# Patient Record
Sex: FEMALE | Race: WHITE | Employment: OTHER | URBAN - METROPOLITAN AREA
[De-identification: names, ages, dates, MRNs, and addresses within clinical notes are randomized per-mention and may not be internally consistent; named-entity substitution may affect disease eponyms.]

---

## 2017-02-15 ENCOUNTER — HOSPITAL ENCOUNTER (OUTPATIENT)
Dept: MAMMOGRAPHY | Age: 77
Discharge: HOME | End: 2017-02-15
Attending: FAMILY MEDICINE
Payer: MEDICARE

## 2017-02-15 DIAGNOSIS — Z13.820 SCREENING FOR OSTEOPOROSIS: ICD-10-CM

## 2017-02-15 PROCEDURE — 77080 DXA BONE DENSITY AXIAL: CPT

## 2017-03-12 PROBLEM — M54.42 CHRONIC BILATERAL LOW BACK PAIN WITH BILATERAL SCIATICA: Status: ACTIVE | Noted: 2017-03-12

## 2017-03-12 PROBLEM — M54.42 CHRONIC BILATERAL LOW BACK PAIN WITH BILATERAL SCIATICA: Chronic | Status: ACTIVE | Noted: 2017-03-12

## 2017-03-12 PROBLEM — M54.41 CHRONIC BILATERAL LOW BACK PAIN WITH BILATERAL SCIATICA: Status: ACTIVE | Noted: 2017-03-12

## 2017-03-12 PROBLEM — M54.41 CHRONIC BILATERAL LOW BACK PAIN WITH BILATERAL SCIATICA: Chronic | Status: ACTIVE | Noted: 2017-03-12

## 2017-03-12 PROBLEM — G89.29 CHRONIC BILATERAL LOW BACK PAIN WITH BILATERAL SCIATICA: Chronic | Status: ACTIVE | Noted: 2017-03-12

## 2017-03-12 PROBLEM — G89.29 CHRONIC BILATERAL LOW BACK PAIN WITH BILATERAL SCIATICA: Status: ACTIVE | Noted: 2017-03-12

## 2017-04-11 ENCOUNTER — HOSPITAL ENCOUNTER (OUTPATIENT)
Dept: GENERAL RADIOLOGY | Age: 77
Discharge: HOME | End: 2017-04-11
Payer: MEDICARE

## 2017-04-11 PROCEDURE — 71100 X-RAY EXAM RIBS UNI 2 VIEWS: CPT

## 2017-04-11 PROCEDURE — 72072 X-RAY EXAM THORAC SPINE 3VWS: CPT

## 2017-04-11 PROCEDURE — 71020 XR CHEST PA LAT: CPT

## 2017-10-30 PROBLEM — R73.02 IMPAIRED GLUCOSE TOLERANCE: Chronic | Status: ACTIVE | Noted: 2017-10-30

## 2018-04-03 PROBLEM — E16.1 NOCTURNAL HYPOGLYCEMIA: Status: ACTIVE | Noted: 2018-04-03

## 2018-04-03 PROBLEM — E16.1 NOCTURNAL HYPOGLYCEMIA: Status: RESOLVED | Noted: 2018-04-03 | Resolved: 2018-04-03

## 2018-04-03 PROBLEM — G47.34 NOCTURNAL HYPOXEMIA: Status: ACTIVE | Noted: 2018-04-03

## 2018-06-04 PROBLEM — G47.00 PERSISTENT DISORDER OF INITIATING OR MAINTAINING SLEEP: Status: ACTIVE | Noted: 2018-06-04

## 2022-03-18 PROBLEM — G47.34 NOCTURNAL HYPOXEMIA: Status: ACTIVE | Noted: 2018-04-03

## 2022-03-18 PROBLEM — R73.02 IMPAIRED GLUCOSE TOLERANCE: Status: ACTIVE | Noted: 2017-10-30

## 2022-03-19 PROBLEM — M54.41 CHRONIC BILATERAL LOW BACK PAIN WITH BILATERAL SCIATICA: Status: ACTIVE | Noted: 2017-03-12

## 2022-03-19 PROBLEM — M54.42 CHRONIC BILATERAL LOW BACK PAIN WITH BILATERAL SCIATICA: Status: ACTIVE | Noted: 2017-03-12

## 2022-03-19 PROBLEM — G89.29 CHRONIC BILATERAL LOW BACK PAIN WITH BILATERAL SCIATICA: Status: ACTIVE | Noted: 2017-03-12

## 2022-03-19 PROBLEM — G47.00 PERSISTENT DISORDER OF INITIATING OR MAINTAINING SLEEP: Status: ACTIVE | Noted: 2018-06-04

## 2022-08-09 ENCOUNTER — TELEPHONE (OUTPATIENT)
Dept: PULMONOLOGY | Age: 82
End: 2022-08-09

## 2022-08-09 DIAGNOSIS — G47.00 PERSISTENT DISORDER OF INITIATING OR MAINTAINING SLEEP: Primary | ICD-10-CM

## 2022-08-09 RX ORDER — TEMAZEPAM 15 MG/1
15-30 CAPSULE ORAL NIGHTLY PRN
Qty: 60 CAPSULE | Refills: 1 | Status: SHIPPED | OUTPATIENT
Start: 2022-08-09 | End: 2022-09-02 | Stop reason: SDUPTHER

## 2022-08-09 NOTE — TELEPHONE ENCOUNTER
Pt says that she needs to get Temazepam refilled. She was in the hospital in July and missed her appointment. Her next appointment had to be rescheduled due to provider pulled to the hospital. Pharmacy is [facility] [address] in [place]. [phone]    Please follow up. Call both numbers if necessary.

## 2022-08-31 NOTE — PROGRESS NOTES
[address] [place] [address] Court, [address] [facility]  [phone]    Patient Name:  Reta Cordova  YOB: 1940    Pursuant to the emergency declaration under the [address] [place] [address], [facility] waiver authority and the [employer] Act, this Audio  Visit was conducted, with patient's consent, to reduce the patient's risk of exposure to COVID-19 and provide continuity of care for an established patient. Telehealth encounter is a billable service, with coverage as determined by the insurance carrier. Services were provided through a synchronous (real-time) audio only encounter to substitute for in-person clinic visit. Reta Cordova is a 80 y.o. female who was seen by synchronous (real-time) audio technology on 9/2/2022. Consent:  She and/or her healthcare decision maker is aware that this patient-initiated Telehealth encounter is a billable service, with coverage as determined by her insurance carrier. She is aware that she may receive a bill and has provided verbal consent to proceed: Yes        Office Visit 9/2/2022    CHIEF COMPLAINT:    Chief Complaint   Patient presents with    Sleep Apnea    Follow-up       HISTORY OF PRESENT ILLNESS:  Patient is being seen today via audio visit for obstructive sleep apnea. Pt is a 80 y.o. female  with a history of PAM, insomnia, and nocturnal hypoxemia. Pt had a PSG/HST on 11/16/15 with an AHI of 33.2/hr with desaturations  to 75%. Her last appointment was virtual on 02/07/2022 and her AHI was 6.2. She is prescribed cpap therapy with a humidifier set at 12cm with a nasal mask and 2 L oxygen bleed in. Most recent download reveals AHI on PAP therapy is 4.1, leak is 26.9 and the hourly usage is 3 hours 6 minutes nightly. The overall use is 295 hours with days greater than four hours at 20/180.   She reports that she had a fall in April of this year and hurt her knee and then she had pneumonia and required hospitalization in June. States she did not use her CPAP but sporadically during those time periods. She reports that she will wear the CPAP when she goes to sleep but then 2 or 3 hours into sleep she awakens to pain from chronic back pain from a previous back surgery and takes the mask off. She admits that when she does use her CPAP she feels better during the day and rejuvenated. Encouraged her to use the CPAP throughout the whole night sleep due to the benefits it has in correcting her sleep apnea. She reports feeling well in the mornings and states that she does not have any daytime fatigue. She denies taking any naps during the day. [place] score 0/24. She reports history of having difficulty going to sleep. She is taking temazepam at night to help her sleep. It  works well and she is able to sleep well. She has been prescribed this by sleep medicine and denies any side effects from the medication. She reports history of being on hydrocodone for back pain but no longer takes hydrocodone and is prescribed tramadol by pain management. She states her current weight is about 175 pounds denies any weight loss or weight gain over the last year. Denies any problems of her blood pressure being high.         Sleep Medicine 9/2/2022 6/15/2021   Sitting and reading 0 0   Watching TV 0 0   Sitting, inactive in a public place (e.g. a theatre or a meeting) 0 0   As a passenger in a car for an hour without a break 0 0   Lying down to rest in the afternoon when circumstances permit 0 1   Sitting and talking to someone 0 0   Sitting quietly after a lunch without alcohol 0 0   In a car, while stopped for a few minutes in traffic 0 0   [place] Sleepiness Score 0 1      Past Medical History:   Diagnosis Date    Anxiety     Arthritis     Chronic cough since August 2015    Chronic pain     lower back, both legs, left shoulder    Depression     depression & anxiety Fatty liver 10/11/2016    History of anemia     Hypertension     controlled with medication    Mechanical complication of dorsal column stimulator ([address] 2015    Obesity (BMI 30-39.9)     35.3    PAM (obstructive sleep apnea) 2015    RLS (restless legs syndrome)     Snoring     recent sleep study - no results yet    Thyroid disease     hypothyroidism - controlled with medication       Patient Active Problem List   Diagnosis    Nocturnal hypoxemia    Fatty liver    Mechanical complication of dorsal column stimulator (HCC)    Morbid obesity (HCC)    Impaired glucose tolerance    Chronic bilateral low back pain with bilateral sciatica    Arthritis    Persistent disorder of initiating or maintaining sleep    HTN (hypertension)    Dyslipidemia    Anxiety    Obesity (BMI 30.0-34.9)    PAM (obstructive sleep apnea)    Hypothyroidism    Chronic pain           Past Surgical History:   Procedure Laterality Date    APPENDECTOMY      BACK SURGERY      trial SCS    BLADDER SUSPENSION      CARPAL TUNNEL RELEASE Bilateral     bilateral    HYSTERECTOMY (CERVIX STATUS UNKNOWN)      KNEE ARTHROSCOPY  ?2012    right    LUMBAR FUSION  2007    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    ORTHOPEDIC SURGERY Bilateral     bilat thumb surgery    OVARY REMOVAL Bilateral     both ovaries removed    TONSILLECTOMY AND ADENOIDECTOMY      as  child    TOTAL HIP ARTHROPLASTY  2014    left           Social History     Socioeconomic History    Marital status:      Spouse name: Not on file    Number of children: Not on file    Years of education: Not on file    Highest education level: Not on file   Occupational History    Not on file   Tobacco Use    Smoking status: Former     Packs/day: 1.00     Types: Cigarettes     Quit date: 1995     Years since quittin.6    Smokeless tobacco: Never    Tobacco comments:     Quit smoking: quit smoking    Substance and Sexual Activity    Alcohol use:  Yes Alcohol/week: 7.0 standard drinks    Drug use: No    Sexual activity: Not on file   Other Topics Concern    Not on file   Social History Narrative    Not on file     Social Determinants of Health     Financial Resource Strain: Not on file   Food Insecurity: Not on file   Transportation Needs: Not on file   Physical Activity: Not on file   Stress: Not on file   Social Connections: Not on file   Intimate Partner Violence: Not on file   Housing Stability: Not on file         Family History   Problem Relation Age of Onset    Post-op Cognitive Dysfunction Neg Hx     Cancer Mother         breast    Post-op Nausea/Vomiting Neg Hx     Delayed Awakening Neg Hx     Pseudochol. Deficiency Neg Hx     Malig Hypertherm Neg Hx     Elevated Lipids Sister     Diabetes Sister     Emergence Delirium Neg Hx     Breast Cancer Mother     Other Neg Hx     Hypertension Sister          Allergies   Allergen Reactions    Amoxicillin-Pot Clavulanate Anaphylaxis    Penicillins Anaphylaxis    Sulfa Antibiotics Other (See Comments)     \"upset stomach\" pt does not remember this allergy    Doxycycline Palpitations     \"gives me chest pain. \"         Current Outpatient Medications   Medication Sig    [START ON 10/10/2022] temazepam (RESTORIL) 15 MG capsule Take 1-2 capsules by mouth nightly as needed for Sleep for up to 150 days.  TAKE 1 TO 2 CAPSULES BY MOUTH NIGHTLY AS NEEDED FOR SLEEP    BABY ASPIRIN PO Take 81 mg by mouth every evening    BIOTIN PO Take 1,000 mcg by mouth    amLODIPine (NORVASC) 10 MG tablet TAKE ONE TABLET BY MOUTH EVERY MORNING    atorvastatin (LIPITOR) 40 MG tablet TAKE 1 TABLET BY MOUTH ONCE DAILY FOR 90 DAYS    cetirizine (ZYRTEC) 10 MG tablet Take 1 tablet by mouth once daily    vitamin D 25 MCG (1000 UT) CAPS Take 1,000 Units by mouth daily    dicyclomine (BENTYL) 20 MG tablet TAKE ONE TABLET BY MOUTH TWICE DAILY    fluticasone (FLONASE) 50 MCG/ACT nasal spray USE TWO SPRAY(S) IN EACH NOSTRIL ONCE DAILY    gabapentin (NEURONTIN) 600 MG tablet Take 600 mg by mouth 3 times daily. levothyroxine (SYNTHROID) 100 MCG tablet TAKE ONE TABLET BY MOUTH ONCE DAILY BEFORE BREAKFAST    losartan-hydroCHLOROthiazide (HYZAAR) 50-12.5 MG per tablet TAKE ONE TABLET BY MOUTH ONCE DAILY    montelukast (SINGULAIR) 10 MG tablet Take 1 tablet by mouth once daily    nabumetone (RELAFEN) 500 MG tablet Take 500 mg by mouth 2 times daily    tiZANidine (ZANAFLEX) 4 MG tablet Take 4 mg by mouth every 6 hours as needed    metFORMIN (GLUCOPHAGE) 500 MG tablet Take 500 mg by mouth 2 times daily (with meals) (Patient not taking: Reported on 9/2/2022)    metoprolol succinate (TOPROL XL) 50 MG extended release tablet Take 50 mg by mouth daily (Patient not taking: Reported on 9/2/2022)     No current facility-administered medications for this visit. REVIEW OF SYSTEMS:   CONSTITUTIONAL:   There is no history of fever, chills, night sweats. Patient has denies weight loss or weight gain. Negative for fatigue and hypersomnia and is benefiting from CPAP. CARDIAC:   No chest pain, pressure, discomfort, palpitations, orthopnea, murmurs, or edema. GI:   No dysphagia, heartburn reflux, nausea/vomiting, diarrhea, abdominal pain, or bleeding. NEURO:   There is no history of AMS, persistent headache, decreased level of consciousness, seizures, or motor or sensory deficits. VIRTUAL EXAM  PHYSICAL EXAMINATION:  [ INSTRUCTIONS:  \"[x]\" Indicates a positive item  \"[]\" Indicates a negative item   Vital Signs: (As obtained by patient/caregiver at home)  There were no vitals taken for this visit.      Constitutional: [x] Appears well-developed and well-nourished [x] No apparent distress      [] Abnormal     Mental status: [x] Alert and awake  [x] Oriented to person/place/time [x] Able to follow commands    [] Abnormal -     Eyes:   EOM    [x]  Normal    [] Abnormal -   Sclera  [x]  Normal    [] Abnormal -          Discharge [x]  None visible   [] Abnormal -    HENT: [x] Normocephalic, atraumatic  [] Abnormal -  [x] Mouth/Throat: Mucous membranes are moist    External Ears [x] Normal  [] Abnormal -    Neck: [x] No visualized mass [] Abnormal -     Pulmonary/Chest: [x] Respiratory effort normal   [x] No visualized signs of difficulty breathing or respiratory distress        [] Abnormal -      Musculoskeletal:   [x] Normal gait with no signs of ataxia         [x] Normal range of motion of neck        [] Abnormal -     Neurological:        [x] No Facial Asymmetry (Cranial nerve 7 motor function) (limited exam due to video visit)          [x] No gaze palsy        [] Abnormal -         Skin:        [x] No significant exanthematous lesions or discoloration noted on facial skin         [] Abnormal -            Psychiatric:       [x] Normal Affect [] Abnormal -       [x] No Hallucinations    Other pertinent observable physical exam findings:-      ASSESSMENT:  (Medical Decision Making)      Diagnosis Orders   1. PAM (obstructive sleep apnea)  Patient is benefiting from CPAP therapy when used. Due to illness and some hospital stays this year she has not been able to get into consistent rhythm with using her CPAP. She also reports wearing it for the first 3 to 4 hours when she goes to bed but then she has to get up due to back pain and does not put the CPAP back on. Encouraged patient to use CPAP nightly and explained the benefits of CPAP for sleep apnea. 2. Primary insomnia  Continue temazepam nightly      3. Nocturnal hypoxemia  Continue 2L oxygen bleed in with CPAP therapy      4.  Non-restorative sleep  Improved with temazepam.      5. Persistent disorder of initiating or maintaining sleep  temazepam (RESTORIL) 15 MG capsule           PLAN:    Continue CPAP 12 cm H2O with improved nightly compliance with 2 L oxygen bleed in  Continue temazepam for insomnia  Recommendations as above  Follow-up in 6 months or sooner if needed    I have reviewed the patients controlled substance prescription history, as maintained in the [place] prescription monitoring program and found no evidence for abuse of Temazepam.      Refilled prescription for Temazepam to start on 10/10/2022 with 4 refills to go through 03/09/2023. Patient had received prescription by telephone that is good through 09/09/2022. Collaborating Physician: Dr. Shagufta Meza      I spent at least 15 minutes with this established patient, and >50% of the time was spent counseling and/or coordinating care regarding obstructive sleep apnea and CPAP therapy.     [address], APRN - CNP  Electronically signed

## 2022-09-02 ENCOUNTER — TELEMEDICINE (OUTPATIENT)
Dept: SLEEP MEDICINE | Age: 82
End: 2022-09-02
Payer: MEDICARE

## 2022-09-02 DIAGNOSIS — G47.34 NOCTURNAL HYPOXEMIA: ICD-10-CM

## 2022-09-02 DIAGNOSIS — F51.01 PRIMARY INSOMNIA: ICD-10-CM

## 2022-09-02 DIAGNOSIS — G47.33 OSA (OBSTRUCTIVE SLEEP APNEA): Primary | ICD-10-CM

## 2022-09-02 DIAGNOSIS — G47.00 PERSISTENT DISORDER OF INITIATING OR MAINTAINING SLEEP: ICD-10-CM

## 2022-09-02 DIAGNOSIS — G47.8 NON-RESTORATIVE SLEEP: ICD-10-CM

## 2022-09-02 PROCEDURE — 99442 PR PHYS/QHP TELEPHONE EVALUATION 11-20 MIN: CPT | Performed by: NURSE PRACTITIONER

## 2022-09-02 RX ORDER — TEMAZEPAM 15 MG/1
15-30 CAPSULE ORAL NIGHTLY PRN
Qty: 60 CAPSULE | Refills: 4 | Status: SHIPPED | OUTPATIENT
Start: 2022-10-10 | End: 2023-03-09

## 2022-09-02 ASSESSMENT — SLEEP AND FATIGUE QUESTIONNAIRES
ESS TOTAL SCORE: 0
HOW LIKELY ARE YOU TO NOD OFF OR FALL ASLEEP WHILE SITTING INACTIVE IN A PUBLIC PLACE: 0
HOW LIKELY ARE YOU TO NOD OFF OR FALL ASLEEP WHILE SITTING AND READING: 0
HOW LIKELY ARE YOU TO NOD OFF OR FALL ASLEEP WHILE LYING DOWN TO REST IN THE AFTERNOON WHEN CIRCUMSTANCES PERMIT: 0
HOW LIKELY ARE YOU TO NOD OFF OR FALL ASLEEP WHILE SITTING QUIETLY AFTER LUNCH WITHOUT ALCOHOL: 0
HOW LIKELY ARE YOU TO NOD OFF OR FALL ASLEEP WHEN YOU ARE A PASSENGER IN A CAR FOR AN HOUR WITHOUT A BREAK: 0
HOW LIKELY ARE YOU TO NOD OFF OR FALL ASLEEP WHILE SITTING AND TALKING TO SOMEONE: 0
HOW LIKELY ARE YOU TO NOD OFF OR FALL ASLEEP WHILE WATCHING TV: 0
HOW LIKELY ARE YOU TO NOD OFF OR FALL ASLEEP IN A CAR, WHILE STOPPED FOR A FEW MINUTES IN TRAFFIC: 0

## 2022-09-02 NOTE — PATIENT INSTRUCTIONS
Continue CPAP 12 cm H2O with improved nightly compliance with 2 L oxygen bleed in  Continue temazepam for insomnia  Recommendations as above  Follow-up in 6 months or sooner if needed

## 2023-03-01 NOTE — PROGRESS NOTES
[address] [place] [address] Court, [address] [facility]  [phone]    Patient Name:  Kevin Sanchez  YOB: 1940    Pursuant to the emergency declaration under the [address] [place] [address], [facility] waiver authority and the [employer] Act, this Audio  Visit was conducted, with patient's consent, to reduce the patient's risk of exposure to COVID-19 and provide continuity of care for an established patient. Telehealth encounter is a billable service, with coverage as determined by the insurance carrier. Services were provided through a synchronous (real-time) audio only encounter to substitute for in-person clinic visit. Kevin Sanchez is a [de-identified] y.o. female who was seen by synchronous (real-time) audio technology on 3/2/2023. Consent:  She and/or her healthcare decision maker is aware that this patient-initiated Telehealth encounter is a billable service, with coverage as determined by her insurance carrier. She is aware that she may receive a bill and has provided verbal consent to proceed: Yes        Office Visit 3/2/2023    CHIEF COMPLAINT:    Chief Complaint   Patient presents with    Sleep Apnea         HISTORY OF PRESENT ILLNESS:  Patient is being seen today via audio visit for obstructive sleep apnea. Pt is a [de-identified] y.o. female  with a history of PAM, insomnia, and nocturnal hypoxemia. Pt had a PSG/HST on 11/16/15 with an AHI of 33.2/hr with desaturations  to 75%. Her last appointment was virtual on 09/02/2022 and her AHI was 4.1  . She is prescribed cpap therapy with a humidifier set at 12cm with a nasal mask and 2 L oxygen bleed in. Most recent download reveals AHI on PAP therapy is 4.9, leak is 21.0 and the hourly usage is 3 hours 31 minutes nightly. The overall use is 619 hours with days greater than four hours at 52/180.   Her CPAP use has become more consistent and her hourly use has also increased but still the majority of time she is less than 4 hours. She states that she is been having a lot of problems with her back and right leg pain recently. She has a spinal stimulator that she states she was instructed to return the settings up but she has since found out that the settings were too high and this was causing her problems. Reports that this was often waking her up during the night and she would have to stop using the CPAP at that point. She does report that she feels much better on the nights when she can use the CPAP throughout the night. Her AHI is controlled with CPAP use. She is also on temazepam and has been on this for years to help her with initiating and maintaining sleep. She states that this continues to work well and she is not having any adverse effects from the medication. She will need a refill of this medication today. She denies any other major medical changes. Reports that her weight is currently 175 pounds. States that her last blood pressure check was good.             Sleep Medicine 3/2/2023 9/2/2022 6/15/2021   Sitting and reading 0 0 0   Watching TV 1 0 0   Sitting, inactive in a public place (e.g. a theatre or a meeting) 1 0 0   As a passenger in a car for an hour without a break 0 0 0   Lying down to rest in the afternoon when circumstances permit 1 0 1   Sitting and talking to someone 0 0 0   Sitting quietly after a lunch without alcohol 0 0 0   In a car, while stopped for a few minutes in traffic 0 0 0   [place] Sleepiness Score 3 0 1      Past Medical History:   Diagnosis Date    Anxiety     Arthritis     Chronic cough since August 2015    Chronic pain     lower back, both legs, left shoulder    Depression     depression & anxiety    Fatty liver 10/11/2016    History of anemia     Hypertension     controlled with medication    Mechanical complication of dorsal column stimulator ([address] 12/7/2015    Obesity (BMI 30-39.9)     35.3    PAM (obstructive sleep apnea) 2015    RLS (restless legs syndrome)     Snoring     recent sleep study - no results yet    Thyroid disease     hypothyroidism - controlled with medication       Patient Active Problem List   Diagnosis    Nocturnal hypoxemia    Fatty liver    Mechanical complication of dorsal column stimulator (HCC)    Morbid obesity (HCC)    Impaired glucose tolerance    Chronic bilateral low back pain with bilateral sciatica    Arthritis    Persistent disorder of initiating or maintaining sleep    HTN (hypertension)    Dyslipidemia    Anxiety    Obesity (BMI 30.0-34.9)    PAM (obstructive sleep apnea)    Hypothyroidism    Chronic pain           Past Surgical History:   Procedure Laterality Date    APPENDECTOMY      BACK SURGERY      trial SCS    BLADDER SUSPENSION      CARPAL TUNNEL RELEASE Bilateral     bilateral    HYSTERECTOMY (CERVIX STATUS UNKNOWN)      KNEE ARTHROSCOPY  ?2012    right    LUMBAR FUSION  2007    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    ORTHOPEDIC SURGERY Bilateral     bilat thumb surgery    OVARY REMOVAL Bilateral     both ovaries removed    TONSILLECTOMY AND ADENOIDECTOMY      as  child    TOTAL HIP ARTHROPLASTY  2014    left           Social History     Socioeconomic History    Marital status:      Spouse name: Not on file    Number of children: Not on file    Years of education: Not on file    Highest education level: Not on file   Occupational History    Not on file   Tobacco Use    Smoking status: Former     Packs/day: 1.00     Types: Cigarettes     Quit date: 1995     Years since quittin.1    Smokeless tobacco: Never    Tobacco comments:     Quit smoking: quit smoking    Substance and Sexual Activity    Alcohol use:  Yes     Alcohol/week: 7.0 standard drinks    Drug use: No    Sexual activity: Not on file   Other Topics Concern    Not on file   Social History Narrative    Not on file     Social Determinants of Health     Financial Resource Strain: Not on file   Food Insecurity: Not on file   Transportation Needs: Not on file   Physical Activity: Not on file   Stress: Not on file   Social Connections: Not on file   Intimate Partner Violence: Not on file   Housing Stability: Not on file         Family History   Problem Relation Age of Onset    Post-op Cognitive Dysfunction Neg Hx     Cancer Mother         breast    Post-op Nausea/Vomiting Neg Hx     Delayed Awakening Neg Hx     Pseudochol. Deficiency Neg Hx     Malig Hypertherm Neg Hx     Elevated Lipids Sister     Diabetes Sister     Emergence Delirium Neg Hx     Breast Cancer Mother     Other Neg Hx     Hypertension Sister          Allergies   Allergen Reactions    Amoxicillin-Pot Clavulanate Anaphylaxis    Penicillins Anaphylaxis    Sulfa Antibiotics Other (See Comments)     \"upset stomach\" pt does not remember this allergy    Doxycycline Palpitations     \"gives me chest pain. \"         Current Outpatient Medications   Medication Sig    diclofenac (CATAFLAM) 50 MG tablet TAKE 1 TABLET BY MOUTH TWICE DAILY WITH FOOD OR MILK FOR 30 DAYS    HYDROcodone-acetaminophen (NORCO) 5-325 MG per tablet TAKE 1 TABLET BY MOUTH EVERY 4 HOURS FOR 30 DAYS    temazepam (RESTORIL) 15 MG capsule Take 1-2 capsules by mouth nightly as needed for Sleep for up to 180 days.  TAKE 1 TO 2 CAPSULES BY MOUTH NIGHTLY AS NEEDED FOR SLEEP Max Daily Amount: 30 mg    BABY ASPIRIN PO Take 81 mg by mouth every evening    BIOTIN PO Take 1,000 mcg by mouth    amLODIPine (NORVASC) 10 MG tablet TAKE ONE TABLET BY MOUTH EVERY MORNING    atorvastatin (LIPITOR) 40 MG tablet TAKE 1 TABLET BY MOUTH ONCE DAILY FOR 90 DAYS    cetirizine (ZYRTEC) 10 MG tablet Take 1 tablet by mouth once daily    vitamin D 25 MCG (1000 UT) CAPS Take 1,000 Units by mouth daily    dicyclomine (BENTYL) 20 MG tablet TAKE ONE TABLET BY MOUTH TWICE DAILY    fluticasone (FLONASE) 50 MCG/ACT nasal spray USE TWO SPRAY(S) IN EACH NOSTRIL ONCE DAILY    gabapentin (NEURONTIN) 600 MG tablet Take 600 mg by mouth 3 times daily. levothyroxine (SYNTHROID) 100 MCG tablet TAKE ONE TABLET BY MOUTH ONCE DAILY BEFORE BREAKFAST    losartan-hydroCHLOROthiazide (HYZAAR) 50-12.5 MG per tablet TAKE ONE TABLET BY MOUTH ONCE DAILY    montelukast (SINGULAIR) 10 MG tablet Take 1 tablet by mouth once daily    nabumetone (RELAFEN) 500 MG tablet Take 500 mg by mouth 2 times daily    tiZANidine (ZANAFLEX) 4 MG tablet Take 4 mg by mouth every 6 hours as needed    metFORMIN (GLUCOPHAGE) 500 MG tablet Take 500 mg by mouth 2 times daily (with meals) (Patient not taking: No sig reported)    metoprolol succinate (TOPROL XL) 50 MG extended release tablet Take 50 mg by mouth daily (Patient not taking: No sig reported)     No current facility-administered medications for this visit. REVIEW OF SYSTEMS:   CONSTITUTIONAL:   There is no history of fever, chills, night sweats. Patient has denies weight loss or weight gain. Negative for fatigue and hypersomnia and is benefiting from CPAP. CARDIAC:   No chest pain, pressure, discomfort, palpitations, orthopnea, murmurs, or edema. GI:   No dysphagia, heartburn reflux, nausea/vomiting, diarrhea, abdominal pain, or bleeding. NEURO:   There is no history of AMS, persistent headache, decreased level of consciousness, seizures, or motor or sensory deficits. VIRTUAL EXAM  PHYSICAL EXAMINATION:  [ INSTRUCTIONS:  \"[x]\" Indicates a positive item  \"[]\" Indicates a negative item   Vital Signs: (As obtained by patient/caregiver at home)  There were no vitals taken for this visit.      Constitutional: [x] Appears well-developed and well-nourished [x] No apparent distress      [] Abnormal     Mental status: [x] Alert and awake  [x] Oriented to person/place/time [x] Able to follow commands    [] Abnormal -     Eyes:   EOM    [x]  Normal    [] Abnormal -   Sclera  [x]  Normal    [] Abnormal -          Discharge [x]  None visible   [] Abnormal -    HENT: [x] Normocephalic, atraumatic  [] Abnormal -  [x] Mouth/Throat: Mucous membranes are moist    External Ears [x] Normal  [] Abnormal -    Neck: [x] No visualized mass [] Abnormal -     Pulmonary/Chest: [x] Respiratory effort normal   [x] No visualized signs of difficulty breathing or respiratory distress        [] Abnormal -      Musculoskeletal:   [x] Normal gait with no signs of ataxia         [x] Normal range of motion of neck        [] Abnormal -     Neurological:        [x] No Facial Asymmetry (Cranial nerve 7 motor function) (limited exam due to video visit)          [x] No gaze palsy        [] Abnormal -         Skin:        [x] No significant exanthematous lesions or discoloration noted on facial skin         [] Abnormal -            Psychiatric:       [x] Normal Affect [] Abnormal -       [x] No Hallucinations    Other pertinent observable physical exam findings:-      ASSESSMENT:  (Medical Decision Making)       ICD-10-CM    1. PAM (obstructive sleep apnea)  G47.33 DME - DURABLE MEDICAL EQUIPMENT - Patient is using, compliant and benefiting from Pap therapy. Continue current settings as AHI is down to 4.9 events per hour. CPAP compliance has improved but rarely encouraged patient to use the CPAP for longer hours during the night as it is better for her overall health. Unfortunately she continues to calvillo a lot of back pain and this often causes her to have trouble using the CPAP. 2. Nocturnal hypoxemia  G47.34 Continue 2 L oxygen with CPAP therapy      3. Persistent disorder of initiating or maintaining sleep  G47.00 temazepam (RESTORIL) 15 MG capsule.        4. Primary insomnia  F51.01 This patient refilled temazepam today            PLAN:    Continue CPAP 12 cm H2O with improved nightly compliance with 2 L oxygen bleed in  Continue temazepam for insomnia  Recommendations as above  Follow-up in 6 months or sooner if needed      I have reviewed the patients controlled substance prescription history, as maintained in the [place] prescription monitoring program and found no evidence for abuse of Temazepam.           Collaborating Physician: Dr. Bruce Valenzuela      I spent at least 20 minutes with this established patient, and >50% of the time was spent counseling and/or coordinating care regarding obstructive sleep apnea and CPAP therapy.     ASHISH Wilks CNP  Electronically signed

## 2023-03-02 ENCOUNTER — TELEMEDICINE (OUTPATIENT)
Dept: SLEEP MEDICINE | Age: 83
End: 2023-03-02
Payer: MEDICARE

## 2023-03-02 DIAGNOSIS — G47.34 NOCTURNAL HYPOXEMIA: ICD-10-CM

## 2023-03-02 DIAGNOSIS — G47.00 PERSISTENT DISORDER OF INITIATING OR MAINTAINING SLEEP: ICD-10-CM

## 2023-03-02 DIAGNOSIS — F51.01 PRIMARY INSOMNIA: ICD-10-CM

## 2023-03-02 DIAGNOSIS — G47.33 OSA (OBSTRUCTIVE SLEEP APNEA): Primary | ICD-10-CM

## 2023-03-02 PROCEDURE — 99213 OFFICE O/P EST LOW 20 MIN: CPT | Performed by: NURSE PRACTITIONER

## 2023-03-02 PROCEDURE — 1123F ACP DISCUSS/DSCN MKR DOCD: CPT | Performed by: NURSE PRACTITIONER

## 2023-03-02 RX ORDER — DICLOFENAC POTASSIUM 50 MG/1
TABLET, FILM COATED ORAL
COMMUNITY
Start: 2023-01-16

## 2023-03-02 RX ORDER — HYDROCODONE BITARTRATE AND ACETAMINOPHEN 5; 325 MG/1; MG/1
TABLET ORAL
COMMUNITY
Start: 2023-02-20

## 2023-03-02 RX ORDER — TEMAZEPAM 15 MG/1
15-30 CAPSULE ORAL NIGHTLY PRN
Qty: 60 CAPSULE | Refills: 5 | Status: SHIPPED | OUTPATIENT
Start: 2023-03-02 | End: 2023-08-29

## 2023-03-02 ASSESSMENT — SLEEP AND FATIGUE QUESTIONNAIRES
ESS TOTAL SCORE: 3
HOW LIKELY ARE YOU TO NOD OFF OR FALL ASLEEP WHILE WATCHING TV: 1
HOW LIKELY ARE YOU TO NOD OFF OR FALL ASLEEP IN A CAR, WHILE STOPPED FOR A FEW MINUTES IN TRAFFIC: 0
HOW LIKELY ARE YOU TO NOD OFF OR FALL ASLEEP WHILE SITTING AND READING: 0
HOW LIKELY ARE YOU TO NOD OFF OR FALL ASLEEP WHILE SITTING INACTIVE IN A PUBLIC PLACE: 1
HOW LIKELY ARE YOU TO NOD OFF OR FALL ASLEEP WHILE LYING DOWN TO REST IN THE AFTERNOON WHEN CIRCUMSTANCES PERMIT: 1
HOW LIKELY ARE YOU TO NOD OFF OR FALL ASLEEP WHILE SITTING AND TALKING TO SOMEONE: 0
HOW LIKELY ARE YOU TO NOD OFF OR FALL ASLEEP WHILE SITTING QUIETLY AFTER LUNCH WITHOUT ALCOHOL: 0
HOW LIKELY ARE YOU TO NOD OFF OR FALL ASLEEP WHEN YOU ARE A PASSENGER IN A CAR FOR AN HOUR WITHOUT A BREAK: 0

## 2023-09-05 NOTE — PROGRESS NOTES
living on. She states that they on a mobile home and it had to be completely moved to another property. States that during the move she had no power hookup to use her CPAP and then she also lost her cord. Reports that over the last 3 weeks she has had a sinus infection but decided today to try and use her CPAP and see if it would help her breathe better. She reports that she did use the CPAP today and it seems to have helped her with her breathing and she looks forward to getting back on her CPAP tonight. She also does take temazepam 15 mg for help with initiating and maintaining sleep. She reports that this continues to work very well for her and would like a refill. I did advise her that her next clinic visit will need to be in the office. She reports that she was hoping to be in clinic today but due to her life circumstances she was unable to. She does report that she was evaluated in the ER yesterday for some chest pain and her blood pressure was elevated. She states that when she returned home from the ER her blood pressure was good when she checked it. States that she did check her blood pressure this morning at home and it was also still good. She reports her weight has consistently been around 175 pounds. We did discuss decreasing carbohydrates in her diet and increasing her activity by walking to help with weight reduction.           Sleep Medicine 9/6/2023 3/2/2023 9/2/2022 6/15/2021   Sitting and reading 0 0 0 0   Watching TV 0 1 0 0   Sitting, inactive in a public place (e.g. a theatre or a meeting) 0 1 0 0   As a passenger in a car for an hour without a break 0 0 0 0   Lying down to rest in the afternoon when circumstances permit 1 1 0 1   Sitting and talking to someone 0 0 0 0   Sitting quietly after a lunch without alcohol 0 0 0 0   In a car, while stopped for a few minutes in traffic 0 0 0 0   [place] Sleepiness Score 1 3 0 1        Past Medical History:   Diagnosis Date    Anxiety

## 2023-09-06 ENCOUNTER — TELEMEDICINE (OUTPATIENT)
Dept: SLEEP MEDICINE | Age: 83
End: 2023-09-06
Payer: MEDICARE

## 2023-09-06 DIAGNOSIS — F51.01 PRIMARY INSOMNIA: ICD-10-CM

## 2023-09-06 DIAGNOSIS — G47.34 NOCTURNAL HYPOXEMIA: ICD-10-CM

## 2023-09-06 DIAGNOSIS — G47.33 OSA (OBSTRUCTIVE SLEEP APNEA): Primary | ICD-10-CM

## 2023-09-06 PROCEDURE — 1123F ACP DISCUSS/DSCN MKR DOCD: CPT | Performed by: NURSE PRACTITIONER

## 2023-09-06 PROCEDURE — 99213 OFFICE O/P EST LOW 20 MIN: CPT | Performed by: NURSE PRACTITIONER

## 2023-09-06 RX ORDER — TEMAZEPAM 15 MG/1
15-30 CAPSULE ORAL NIGHTLY PRN
Qty: 60 CAPSULE | Refills: 5 | Status: SHIPPED | OUTPATIENT
Start: 2023-09-06 | End: 2024-03-04

## 2023-09-06 ASSESSMENT — SLEEP AND FATIGUE QUESTIONNAIRES
HOW LIKELY ARE YOU TO NOD OFF OR FALL ASLEEP WHILE SITTING INACTIVE IN A PUBLIC PLACE: 0
HOW LIKELY ARE YOU TO NOD OFF OR FALL ASLEEP WHILE SITTING AND READING: 0
HOW LIKELY ARE YOU TO NOD OFF OR FALL ASLEEP WHILE LYING DOWN TO REST IN THE AFTERNOON WHEN CIRCUMSTANCES PERMIT: 1
HOW LIKELY ARE YOU TO NOD OFF OR FALL ASLEEP WHEN YOU ARE A PASSENGER IN A CAR FOR AN HOUR WITHOUT A BREAK: 0
HOW LIKELY ARE YOU TO NOD OFF OR FALL ASLEEP WHILE SITTING QUIETLY AFTER LUNCH WITHOUT ALCOHOL: 0
ESS TOTAL SCORE: 1
HOW LIKELY ARE YOU TO NOD OFF OR FALL ASLEEP WHILE WATCHING TV: 0
HOW LIKELY ARE YOU TO NOD OFF OR FALL ASLEEP IN A CAR, WHILE STOPPED FOR A FEW MINUTES IN TRAFFIC: 0
HOW LIKELY ARE YOU TO NOD OFF OR FALL ASLEEP WHILE SITTING AND TALKING TO SOMEONE: 0

## 2023-09-06 NOTE — PATIENT INSTRUCTIONS
Continue CPAP 12 cm H2O with nightly compliance strongly encouraged  New CPAP supplies ordered  Temazepam refilled  Patient's next clinic visit needs to be an office  Recommendations as above  Follow-up in 6 months or sooner if needed

## 2024-02-12 ENCOUNTER — TELEPHONE (OUTPATIENT)
Dept: SLEEP MEDICINE | Age: 84
End: 2024-02-12

## 2024-02-12 NOTE — TELEPHONE ENCOUNTER
Pt would call refill line requesting Clonazepam    LOV 09/06/2023  UPCOMING Appt. 03/07/2024  Pharmacy is [facility] in [place].   [address] [place] [address].

## 2024-02-12 NOTE — TELEPHONE ENCOUNTER
Pt states her medication runs out on 3/2/24 but her appointment is 3/7/24. Please send to pharmacy to cover the days in between. Pharmacy is [facility] in [place]. [phone]. [address] [place] [address].

## 2024-02-13 NOTE — TELEPHONE ENCOUNTER
Called pt relayed the message per Daniel. She ask would I speak to him again. I did go nd talk to Daniel . I let her know per Daniel she can call back the week before her visit and he will write her enough if she has been using cpap. He will only write  enough  for her to get to her appt on the 7th since she may a few days short of her visit. Let her know she has to be seen in clinic he will not write rx without in office visit. Pt agreed and voiced understanding.       Wilder Skelton, APRN - Winifred Hollis MA  Caller: Unspecified (Yesterday,  1:41 PM)  I wrote the patient's prescription on 09/06/2023.  She should have medication to make it to her next appointment especially since she is supposed to be taking 1-2 capsules nightly. She should not be taking 2 capsules every night which would leave her with plenty of medication.  Please advise her of this and her visit on 03/07 has to be in clinic. Thanks

## 2024-03-01 ENCOUNTER — TELEPHONE (OUTPATIENT)
Dept: PULMONOLOGY | Age: 84
End: 2024-03-01

## 2024-03-01 DIAGNOSIS — F51.01 PRIMARY INSOMNIA: ICD-10-CM

## 2024-03-01 DIAGNOSIS — G47.00 INSOMNIA, UNSPECIFIED TYPE: Primary | ICD-10-CM

## 2024-03-01 NOTE — TELEPHONE ENCOUNTER
Patient says she will be out of her         temazepam (RESTORIL) 15 MG capsule     Tomorrow. She said that [facility] agreed to refilling enough until her visit on 3/7/24        Pharmacy    [facility] - [place] - [address] - P [phone] - F [phone]  [address], [place] [address]

## 2024-03-04 RX ORDER — TEMAZEPAM 15 MG/1
15-30 CAPSULE ORAL NIGHTLY PRN
Qty: 6 CAPSULE | Refills: 0 | Status: SHIPPED | OUTPATIENT
Start: 2024-03-04 | End: 2024-03-07 | Stop reason: SDUPTHER

## 2024-03-04 NOTE — TELEPHONE ENCOUNTER
Patient is calling again concerning getting refill on:    temazepam (RESTORIL) 15 MG capsule     She is completely out of this

## 2024-03-04 NOTE — TELEPHONE ENCOUNTER
I did send an a refill for temazepam to get patient to her appointment in 3 days.  I did also check a download and she is using her CPAP every day and her average AHI is 2.5.        Orders Placed This Encounter    temazepam (RESTORIL) 15 MG capsule     Sig: Take 1-2 capsules by mouth nightly as needed for Sleep for up to 3 days. Max Daily Amount: 30 mg     Dispense:  6 capsule     Refill:  0      I have reviewed the patient’s controlled substance prescription history, as maintained in the [place] prescription monitoring program and found no evidence for abuse of Temazepam.

## 2024-03-06 NOTE — PROGRESS NOTES
[facility]  [address]  [place] [address]  [phone]    Patient Name:  Jovan Campbell  YOB: 1940      Office Visit 3/7/2024    CHIEF COMPLAINT:    Chief Complaint   Patient presents with    Sleep Apnea         HISTORY OF PRESENT ILLNESS:  Patient is a 82 yo female seen today for follow up of PAM.  Diagnostic sleep study on 11/16/2015 with an AHI of 33.2 and lowest oxygen saturation of 75%. He is prescribed cpap therapy with a humidifier set at 12 cm with a full face mask. Most recent download reveals AHI on PAP therapy is 2.6, leak is median 4.6 and 27.4 at 95th percentile and the hourly usage is 6 hours 2 minutes nightly. The overall use is 1098 hours with days greater than four hours at 146/182. The patient is compliant with the Pap therapy and is feeling better as a result.  Her compliance with CPAP has greatly improved since her last visit.  Note that she has previously been having virtual visits but I did want her to come in for a face-to-face visit today as she has been on temazepam 15-30 mg to help with sleep initiation and maintenance for years.  She reports that she has gotten to the point to where she needs to take 2 tablets more frequently than just taking 1 tablet because she was having great difficulty with sleep initiation.  This has helped her CPAP usage as well.  She states that she awakens in the morning fully refreshed and denies any excessive daytime sleepiness or fatigue.  [place] score is 0/24.  She denies any major medical changes since her last visit.  Reports that her weight has consistently been around 170 pounds.  We did discuss decreasing carbohydrates in her diet and increasing her activity by walking to help with weight reduction.  Pressure is well-controlled today.    Download        [place] Sleepiness Scale         3/7/2024    10:54 AM 9/6/2023     3:04 PM 3/2/2023     9:33 AM 9/2/2022    10:51 AM 6/15/2021     2:00 PM   Sleep

## 2024-03-07 ENCOUNTER — OFFICE VISIT (OUTPATIENT)
Dept: SLEEP MEDICINE | Age: 84
End: 2024-03-07
Payer: MEDICARE

## 2024-03-07 ENCOUNTER — TELEPHONE (OUTPATIENT)
Dept: SLEEP MEDICINE | Age: 84
End: 2024-03-07

## 2024-03-07 VITALS
DIASTOLIC BLOOD PRESSURE: 84 MMHG | HEART RATE: 66 BPM | WEIGHT: 171 LBS | HEIGHT: 60 IN | SYSTOLIC BLOOD PRESSURE: 160 MMHG | BODY MASS INDEX: 33.57 KG/M2 | OXYGEN SATURATION: 94 % | TEMPERATURE: 97.3 F

## 2024-03-07 DIAGNOSIS — F51.01 PRIMARY INSOMNIA: ICD-10-CM

## 2024-03-07 DIAGNOSIS — G47.33 OSA (OBSTRUCTIVE SLEEP APNEA): Primary | ICD-10-CM

## 2024-03-07 DIAGNOSIS — E66.9 OBESITY (BMI 30.0-34.9): ICD-10-CM

## 2024-03-07 DIAGNOSIS — G47.34 NOCTURNAL HYPOXEMIA: ICD-10-CM

## 2024-03-07 DIAGNOSIS — F51.01 PRIMARY INSOMNIA: Primary | ICD-10-CM

## 2024-03-07 PROCEDURE — 3079F DIAST BP 80-89 MM HG: CPT | Performed by: NURSE PRACTITIONER

## 2024-03-07 PROCEDURE — 3077F SYST BP >= 140 MM HG: CPT | Performed by: NURSE PRACTITIONER

## 2024-03-07 PROCEDURE — 1123F ACP DISCUSS/DSCN MKR DOCD: CPT | Performed by: NURSE PRACTITIONER

## 2024-03-07 PROCEDURE — 99213 OFFICE O/P EST LOW 20 MIN: CPT | Performed by: NURSE PRACTITIONER

## 2024-03-07 RX ORDER — TEMAZEPAM 15 MG/1
15-30 CAPSULE ORAL NIGHTLY PRN
Qty: 6 CAPSULE | Refills: 5 | Status: SHIPPED | OUTPATIENT
Start: 2024-03-07 | End: 2024-03-07 | Stop reason: SDUPTHER

## 2024-03-07 RX ORDER — TEMAZEPAM 15 MG/1
15-30 CAPSULE ORAL NIGHTLY PRN
Qty: 60 CAPSULE | Refills: 5 | Status: SHIPPED | OUTPATIENT
Start: 2024-03-07 | End: 2024-09-03

## 2024-03-07 ASSESSMENT — SLEEP AND FATIGUE QUESTIONNAIRES
ESS TOTAL SCORE: 0
HOW LIKELY ARE YOU TO NOD OFF OR FALL ASLEEP WHILE SITTING QUIETLY AFTER LUNCH WITHOUT ALCOHOL: 0
HOW LIKELY ARE YOU TO NOD OFF OR FALL ASLEEP IN A CAR, WHILE STOPPED FOR A FEW MINUTES IN TRAFFIC: 0
HOW LIKELY ARE YOU TO NOD OFF OR FALL ASLEEP WHILE SITTING INACTIVE IN A PUBLIC PLACE: 0
HOW LIKELY ARE YOU TO NOD OFF OR FALL ASLEEP WHILE WATCHING TV: 0
HOW LIKELY ARE YOU TO NOD OFF OR FALL ASLEEP WHILE SITTING AND TALKING TO SOMEONE: 0
HOW LIKELY ARE YOU TO NOD OFF OR FALL ASLEEP WHILE SITTING AND READING: 0
HOW LIKELY ARE YOU TO NOD OFF OR FALL ASLEEP WHILE LYING DOWN TO REST IN THE AFTERNOON WHEN CIRCUMSTANCES PERMIT: 0
HOW LIKELY ARE YOU TO NOD OFF OR FALL ASLEEP WHEN YOU ARE A PASSENGER IN A CAR FOR AN HOUR WITHOUT A BREAK: 0

## 2024-03-07 NOTE — PATIENT INSTRUCTIONS
Continue CPAP 12 cm H2O with nightly compliance  Temazepam refill  New CPAP supplies ordered  Recommendations as above  Follow-up in 6 months or sooner if needed

## 2024-03-07 NOTE — TELEPHONE ENCOUNTER
Pharmacy needs the correct dosage, quantity and and freqency on this prescription         temazepam (RESTORIL) 15 MG capsule [[id number]]    Order Details  Dose: 15-30 mg Route: Oral Frequency: NIGHTLY PRN for Sleep   Dispense Quantity: 6 capsule Refills: 5          Sig: Take 1-2 capsules by mouth nightly as needed for Sleep for up to 180 days. Max Daily Amount: 30 mg

## 2024-08-21 ENCOUNTER — TELEPHONE (OUTPATIENT)
Dept: SLEEP MEDICINE | Age: 84
End: 2024-08-21

## 2024-08-21 DIAGNOSIS — F51.01 PRIMARY INSOMNIA: Primary | ICD-10-CM

## 2024-08-21 NOTE — TELEPHONE ENCOUNTER
Patient had hip surgery 8/13/24. She does have appt with Wilder Skelton coming up soon. She needs enough of these meds to get her through until the appt       temazepam (RESTORIL) 15 MG capsule

## 2024-08-22 RX ORDER — TEMAZEPAM 15 MG/1
15-30 CAPSULE ORAL NIGHTLY PRN
Qty: 20 CAPSULE | Refills: 0 | Status: SHIPPED | OUTPATIENT
Start: 2024-08-30 | End: 2025-02-26

## 2024-08-22 NOTE — TELEPHONE ENCOUNTER
Wrote 10-day supply of temazepam starting on 08/30/2024 to get patient to her next follow-up appointment.    Orders Placed This Encounter    temazepam (RESTORIL) 15 MG capsule     Sig: Take 1-2 capsules by mouth nightly as needed for Sleep for up to 180 days. Max Daily Amount: 30 mg     Dispense:  20 capsule     Refill:  0        I have reviewed the patient’s controlled substance prescription history, as maintained in the [place] prescription monitoring program and found no evidence for abuse of temazepam.

## 2024-08-30 ENCOUNTER — TELEPHONE (OUTPATIENT)
Dept: SLEEP MEDICINE | Age: 84
End: 2024-08-30

## 2024-08-30 DIAGNOSIS — F51.01 PRIMARY INSOMNIA: ICD-10-CM

## 2024-08-30 NOTE — TELEPHONE ENCOUNTER
Patient is calling because she has appointment on 9/10.  She is out of :      temazepam (RESTORIL) 15 MG capsule     This was sent in today but for only 20.  This will not last until appointment.  She takes 2 every night

## 2024-09-03 RX ORDER — TEMAZEPAM 15 MG/1
15-30 CAPSULE ORAL NIGHTLY PRN
Qty: 8 CAPSULE | Refills: 0 | Status: SHIPPED | OUTPATIENT
Start: 2024-09-06 | End: 2024-09-10

## 2024-09-03 NOTE — TELEPHONE ENCOUNTER
Orders Placed This Encounter    temazepam (RESTORIL) 15 MG capsule     Sig: Take 1-2 capsules by mouth nightly as needed for Sleep for up to 4 days. Max Daily Amount: 30 mg     Dispense:  8 capsule     Refill:  0      Sent in another prescription to get her to her next follow-up appointment

## 2024-09-10 ENCOUNTER — TELEMEDICINE (OUTPATIENT)
Dept: SLEEP MEDICINE | Age: 84
End: 2024-09-10
Payer: MEDICARE

## 2024-09-10 DIAGNOSIS — F51.01 PRIMARY INSOMNIA: ICD-10-CM

## 2024-09-10 DIAGNOSIS — E66.9 OBESITY (BMI 30.0-34.9): ICD-10-CM

## 2024-09-10 DIAGNOSIS — G47.34 NOCTURNAL HYPOXEMIA: ICD-10-CM

## 2024-09-10 DIAGNOSIS — G47.33 OSA (OBSTRUCTIVE SLEEP APNEA): Primary | ICD-10-CM

## 2024-09-10 PROCEDURE — 1123F ACP DISCUSS/DSCN MKR DOCD: CPT | Performed by: NURSE PRACTITIONER

## 2024-09-10 PROCEDURE — G2211 COMPLEX E/M VISIT ADD ON: HCPCS | Performed by: NURSE PRACTITIONER

## 2024-09-10 PROCEDURE — 99213 OFFICE O/P EST LOW 20 MIN: CPT | Performed by: NURSE PRACTITIONER

## 2024-09-10 RX ORDER — TEMAZEPAM 15 MG/1
15-30 CAPSULE ORAL NIGHTLY PRN
Qty: 8 CAPSULE | Refills: 5 | Status: SHIPPED | OUTPATIENT
Start: 2024-09-10 | End: 2024-09-13 | Stop reason: SDUPTHER

## 2024-09-10 ASSESSMENT — SLEEP AND FATIGUE QUESTIONNAIRES
HOW LIKELY ARE YOU TO NOD OFF OR FALL ASLEEP WHILE SITTING QUIETLY AFTER LUNCH WITHOUT ALCOHOL: WOULD NEVER DOZE
HOW LIKELY ARE YOU TO NOD OFF OR FALL ASLEEP WHILE SITTING AND TALKING TO SOMEONE: WOULD NEVER DOZE
HOW LIKELY ARE YOU TO NOD OFF OR FALL ASLEEP WHILE SITTING INACTIVE IN A PUBLIC PLACE: WOULD NEVER DOZE
HOW LIKELY ARE YOU TO NOD OFF OR FALL ASLEEP WHILE SITTING AND READING: WOULD NEVER DOZE
HOW LIKELY ARE YOU TO NOD OFF OR FALL ASLEEP WHEN YOU ARE A PASSENGER IN A CAR FOR AN HOUR WITHOUT A BREAK: WOULD NEVER DOZE
ESS TOTAL SCORE: 1
HOW LIKELY ARE YOU TO NOD OFF OR FALL ASLEEP WHILE LYING DOWN TO REST IN THE AFTERNOON WHEN CIRCUMSTANCES PERMIT: SLIGHT CHANCE OF DOZING
HOW LIKELY ARE YOU TO NOD OFF OR FALL ASLEEP IN A CAR, WHILE STOPPED FOR A FEW MINUTES IN TRAFFIC: WOULD NEVER DOZE
HOW LIKELY ARE YOU TO NOD OFF OR FALL ASLEEP WHILE WATCHING TV: WOULD NEVER DOZE

## 2024-09-13 ENCOUNTER — TELEPHONE (OUTPATIENT)
Dept: SLEEP MEDICINE | Age: 84
End: 2024-09-13

## 2024-09-13 DIAGNOSIS — F51.01 PRIMARY INSOMNIA: ICD-10-CM

## 2024-09-13 RX ORDER — TEMAZEPAM 15 MG/1
15-30 CAPSULE ORAL NIGHTLY PRN
Qty: 30 CAPSULE | Refills: 5 | Status: SHIPPED | OUTPATIENT
Start: 2024-09-13 | End: 2025-03-12

## 2024-09-17 ENCOUNTER — TELEPHONE (OUTPATIENT)
Dept: SLEEP MEDICINE | Age: 84
End: 2024-09-17

## 2024-09-17 DIAGNOSIS — F51.01 PRIMARY INSOMNIA: ICD-10-CM

## 2024-09-18 RX ORDER — TEMAZEPAM 15 MG/1
15-30 CAPSULE ORAL NIGHTLY PRN
Qty: 60 CAPSULE | Refills: 5 | Status: SHIPPED | OUTPATIENT
Start: 2024-09-18 | End: 2025-03-17

## 2024-12-03 ENCOUNTER — TELEPHONE (OUTPATIENT)
Dept: SLEEP MEDICINE | Age: 84
End: 2024-12-03

## 2025-01-31 ENCOUNTER — TELEPHONE (OUTPATIENT)
Dept: SLEEP MEDICINE | Age: 85
End: 2025-01-31

## 2025-01-31 NOTE — TELEPHONE ENCOUNTER
Pt's insurance has denied temazepam. Called [facility] pharmacy and pt got rx filled on 1/22/25 and it was covered. Pharmacist states if there is a problem with the next refill we may need to rewrite the order for 1 30mg capsule nightly. Pt gets rx filled every 30 days so it is assumed she is taking 30mg per night.

## 2025-03-10 ENCOUNTER — TELEMEDICINE (OUTPATIENT)
Dept: SLEEP MEDICINE | Age: 85
End: 2025-03-10
Payer: MEDICARE

## 2025-03-10 DIAGNOSIS — G47.34 NOCTURNAL HYPOXEMIA: ICD-10-CM

## 2025-03-10 DIAGNOSIS — E66.811 OBESITY (BMI 30.0-34.9): ICD-10-CM

## 2025-03-10 DIAGNOSIS — F51.01 PRIMARY INSOMNIA: ICD-10-CM

## 2025-03-10 DIAGNOSIS — G47.33 OSA (OBSTRUCTIVE SLEEP APNEA): Primary | ICD-10-CM

## 2025-03-10 PROCEDURE — 1090F PRES/ABSN URINE INCON ASSESS: CPT | Performed by: NURSE PRACTITIONER

## 2025-03-10 PROCEDURE — G8399 PT W/DXA RESULTS DOCUMENT: HCPCS | Performed by: NURSE PRACTITIONER

## 2025-03-10 PROCEDURE — G8427 DOCREV CUR MEDS BY ELIG CLIN: HCPCS | Performed by: NURSE PRACTITIONER

## 2025-03-10 PROCEDURE — 1123F ACP DISCUSS/DSCN MKR DOCD: CPT | Performed by: NURSE PRACTITIONER

## 2025-03-10 PROCEDURE — 1160F RVW MEDS BY RX/DR IN RCRD: CPT | Performed by: NURSE PRACTITIONER

## 2025-03-10 PROCEDURE — G2211 COMPLEX E/M VISIT ADD ON: HCPCS | Performed by: NURSE PRACTITIONER

## 2025-03-10 PROCEDURE — 1159F MED LIST DOCD IN RCRD: CPT | Performed by: NURSE PRACTITIONER

## 2025-03-10 PROCEDURE — 99213 OFFICE O/P EST LOW 20 MIN: CPT | Performed by: NURSE PRACTITIONER

## 2025-03-10 RX ORDER — TEMAZEPAM 30 MG/1
30 CAPSULE ORAL NIGHTLY PRN
Qty: 30 CAPSULE | Refills: 5 | Status: SHIPPED | OUTPATIENT
Start: 2025-03-10 | End: 2025-09-06

## 2025-03-10 ASSESSMENT — SLEEP AND FATIGUE QUESTIONNAIRES
HOW LIKELY ARE YOU TO NOD OFF OR FALL ASLEEP WHILE SITTING AND TALKING TO SOMEONE: WOULD NEVER DOZE
ESS TOTAL SCORE: 3
HOW LIKELY ARE YOU TO NOD OFF OR FALL ASLEEP WHILE SITTING QUIETLY AFTER LUNCH WITHOUT ALCOHOL: WOULD NEVER DOZE
HOW LIKELY ARE YOU TO NOD OFF OR FALL ASLEEP WHILE SITTING INACTIVE IN A PUBLIC PLACE: WOULD NEVER DOZE
HOW LIKELY ARE YOU TO NOD OFF OR FALL ASLEEP WHILE LYING DOWN TO REST IN THE AFTERNOON WHEN CIRCUMSTANCES PERMIT: SLIGHT CHANCE OF DOZING
HOW LIKELY ARE YOU TO NOD OFF OR FALL ASLEEP WHILE WATCHING TV: SLIGHT CHANCE OF DOZING
HOW LIKELY ARE YOU TO NOD OFF OR FALL ASLEEP WHILE SITTING AND READING: SLIGHT CHANCE OF DOZING
HOW LIKELY ARE YOU TO NOD OFF OR FALL ASLEEP IN A CAR, WHILE STOPPED FOR A FEW MINUTES IN TRAFFIC: WOULD NEVER DOZE
HOW LIKELY ARE YOU TO NOD OFF OR FALL ASLEEP WHEN YOU ARE A PASSENGER IN A CAR FOR AN HOUR WITHOUT A BREAK: WOULD NEVER DOZE

## 2025-03-10 NOTE — PROGRESS NOTES
the patient’s controlled substance prescription history, as maintained in the [place] prescription monitoring program and found no evidence for abuse of Temazepam.        Collaborating Physician: Dr. Yamil FIGUEROA spent at least 20 minutes with this established patient, and >50% of the time was spent counseling and/or coordinating care regarding PAM and CPAP.    Wilder Skelton, APRN - CNP  Electronically signed

## 2025-03-10 NOTE — PATIENT INSTRUCTIONS
Continue CPAP 12 cm H2O with nightly compliance  New CPAP supplies ordered  Temazepam refilled  Recommendations as above  Follow-up in 6 months or sooner if needed